# Patient Record
Sex: MALE
[De-identification: names, ages, dates, MRNs, and addresses within clinical notes are randomized per-mention and may not be internally consistent; named-entity substitution may affect disease eponyms.]

---

## 2020-10-13 ENCOUNTER — APPOINTMENT (OUTPATIENT)
Dept: HEART AND VASCULAR | Facility: CLINIC | Age: 85
End: 2020-10-13
Payer: MEDICARE

## 2020-10-13 ENCOUNTER — NON-APPOINTMENT (OUTPATIENT)
Age: 85
End: 2020-10-13

## 2020-10-13 VITALS
BODY MASS INDEX: 24.32 KG/M2 | WEIGHT: 146 LBS | SYSTOLIC BLOOD PRESSURE: 101 MMHG | OXYGEN SATURATION: 98 % | TEMPERATURE: 97.7 F | HEART RATE: 59 BPM | DIASTOLIC BLOOD PRESSURE: 60 MMHG | HEIGHT: 65 IN

## 2020-10-13 DIAGNOSIS — Z79.01 LONG TERM (CURRENT) USE OF ANTICOAGULANTS: ICD-10-CM

## 2020-10-13 DIAGNOSIS — Z82.49 FAMILY HISTORY OF ISCHEMIC HEART DISEASE AND OTHER DISEASES OF THE CIRCULATORY SYSTEM: ICD-10-CM

## 2020-10-13 DIAGNOSIS — Z80.9 FAMILY HISTORY OF MALIGNANT NEOPLASM, UNSPECIFIED: ICD-10-CM

## 2020-10-13 DIAGNOSIS — I10 ESSENTIAL (PRIMARY) HYPERTENSION: ICD-10-CM

## 2020-10-13 DIAGNOSIS — I48.91 UNSPECIFIED ATRIAL FIBRILLATION: ICD-10-CM

## 2020-10-13 PROCEDURE — 93000 ELECTROCARDIOGRAM COMPLETE: CPT

## 2020-10-13 PROCEDURE — 99214 OFFICE O/P EST MOD 30 MIN: CPT

## 2020-10-13 RX ORDER — CALCIUM CARBONATE 600 MG
TABLET ORAL
Refills: 0 | Status: ACTIVE | COMMUNITY

## 2020-10-13 RX ORDER — ACETYLCYSTEINE 600 MG
600 TABLET,IMMED, EXTENDED RELEASE, BIPHASIC ORAL
Refills: 0 | Status: COMPLETED | COMMUNITY

## 2020-10-13 RX ORDER — ACETYLCYSTEINE 600 MG
600 CAPSULE ORAL
Refills: 0 | Status: COMPLETED | COMMUNITY

## 2020-10-13 RX ORDER — ACETYLCYSTEINE 600 MG
600 TABLET,IMMED, EXTENDED RELEASE, BIPHASIC ORAL
Refills: 0 | Status: ACTIVE | COMMUNITY

## 2020-10-13 RX ORDER — CALCIUM CARBONATE/VITAMIN D3 600 MG-20
TABLET ORAL
Refills: 0 | Status: COMPLETED | COMMUNITY

## 2020-10-13 RX ORDER — METFORMIN HYDROCHLORIDE 500 MG/1
500 TABLET, COATED ORAL
Refills: 0 | Status: DISCONTINUED | COMMUNITY
End: 2020-10-13

## 2020-10-13 RX ORDER — METFORMIN HYDROCHLORIDE 500 MG/1
500 TABLET, COATED ORAL
Refills: 0 | Status: COMPLETED | COMMUNITY

## 2020-10-13 NOTE — REVIEW OF SYSTEMS
PAIN/JOINT SWELLING [Eyeglasses] : currently wearing eyeglasses [Cough] : cough [Negative] : Heme/Lymph

## 2020-10-19 PROBLEM — Z79.01 ANTICOAGULANT LONG-TERM USE: Status: ACTIVE | Noted: 2020-10-13

## 2020-10-19 PROBLEM — I10 BENIGN ESSENTIAL HYPERTENSION: Status: ACTIVE | Noted: 2020-10-13

## 2020-10-19 NOTE — REASON FOR VISIT
[Follow-Up - Clinic] : a clinic follow-up of [Atrial Fibrillation] : atrial fibrillation [FreeTextEntry1] : This is an 85 year old male with PMHx of chronic atrial fibrillation w/ ablation (on Xarelto) and benign essential hypertension who presents to clinic for routine follow-up.\par \par Since last visit:\par -Patient complains of weight loss (8lbs in 8 months).\par -Raspy voice and phlegm and some cough, has appointment with ENT this afternoon.\par -Patient never had COVID19, currently no symptoms or fever.\par \par

## 2020-10-19 NOTE — DISCUSSION/SUMMARY
[Atrial Fibrillation] : atrial fibrillation [Persistent Atrial Fibrillation] : persistent atrial fibrillation [Stable] : stable [Rate Control] : rate control [Anticoagulation] : anticoagulation [None] : none [Patient] : the patient [Minutes spent___] : for [unfilled] ~Uminutes [___ Month(s)] : [unfilled] month(s) [FreeTextEntry1] : Patient with complaints of weight loss (8 lbs in 8 months), however admits to eating less and feeling more stress lately.  Will improve calorie intake.  Exercises regularly.  For now, we will discontinue Metformin as patient has recently started with stable A1c, however may be contributing to inadvertent weight loss.